# Patient Record
Sex: MALE | Race: WHITE | NOT HISPANIC OR LATINO | Employment: FULL TIME | URBAN - METROPOLITAN AREA
[De-identification: names, ages, dates, MRNs, and addresses within clinical notes are randomized per-mention and may not be internally consistent; named-entity substitution may affect disease eponyms.]

---

## 2017-06-11 ENCOUNTER — HOSPITAL ENCOUNTER (EMERGENCY)
Facility: HOSPITAL | Age: 22
Discharge: HOME | End: 2017-06-11
Attending: EMERGENCY MEDICINE | Admitting: EMERGENCY MEDICINE
Payer: COMMERCIAL

## 2017-06-11 VITALS
BODY MASS INDEX: 33.86 KG/M2 | TEMPERATURE: 99.3 F | SYSTOLIC BLOOD PRESSURE: 133 MMHG | RESPIRATION RATE: 18 BRPM | DIASTOLIC BLOOD PRESSURE: 80 MMHG | HEIGHT: 72 IN | HEART RATE: 100 BPM | OXYGEN SATURATION: 98 % | WEIGHT: 250 LBS

## 2017-06-11 DIAGNOSIS — S01.511A LACERATION OF UPPER LIP, COMPLICATED, INITIAL ENCOUNTER: Primary | ICD-10-CM
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
## 2017-06-11 PROCEDURE — 99282 EMERGENCY DEPT VISIT SF MDM: CPT

## 2017-06-11 RX ORDER — LIDOCAINE HYDROCHLORIDE 10 MG/ML
10 INJECTION, SOLUTION EPIDURAL; INFILTRATION; INTRACAUDAL; PERINEURAL ONCE
Status: COMPLETED | OUTPATIENT
Start: 2017-06-11 | End: 2017-06-11

## 2017-06-11 RX ADMIN — LIDOCAINE HYDROCHLORIDE 10 ML: 10 INJECTION, SOLUTION EPIDURAL; INFILTRATION; INTRACAUDAL; PERINEURAL at 16:52

## 2017-06-16 ENCOUNTER — HOSPITAL ENCOUNTER (EMERGENCY)
Facility: HOSPITAL | Age: 22
Discharge: HOME | End: 2017-06-16
Payer: COMMERCIAL

## 2017-06-16 VITALS
DIASTOLIC BLOOD PRESSURE: 63 MMHG | BODY MASS INDEX: 33.59 KG/M2 | HEART RATE: 83 BPM | WEIGHT: 248 LBS | TEMPERATURE: 98.2 F | SYSTOLIC BLOOD PRESSURE: 140 MMHG | HEIGHT: 72 IN | OXYGEN SATURATION: 97 % | RESPIRATION RATE: 18 BRPM

## 2017-06-16 DIAGNOSIS — Z48.02 VISIT FOR SUTURE REMOVAL: Primary | ICD-10-CM

## 2017-06-16 PROCEDURE — 99281 EMR DPT VST MAYX REQ PHY/QHP: CPT

## 2017-10-27 ENCOUNTER — APPOINTMENT (EMERGENCY)
Dept: RADIOLOGY | Facility: HOSPITAL | Age: 22
End: 2017-10-27
Payer: COMMERCIAL

## 2017-10-27 ENCOUNTER — HOSPITAL ENCOUNTER (EMERGENCY)
Facility: HOSPITAL | Age: 22
Discharge: HOME | End: 2017-10-27
Admitting: EMERGENCY MEDICINE
Payer: COMMERCIAL

## 2017-10-27 VITALS
RESPIRATION RATE: 20 BRPM | BODY MASS INDEX: 32.55 KG/M2 | TEMPERATURE: 101.1 F | DIASTOLIC BLOOD PRESSURE: 80 MMHG | WEIGHT: 240 LBS | HEART RATE: 103 BPM | OXYGEN SATURATION: 95 % | SYSTOLIC BLOOD PRESSURE: 145 MMHG

## 2017-10-27 DIAGNOSIS — R05.9 COUGH: Primary | ICD-10-CM

## 2017-10-27 DIAGNOSIS — R50.9 FEVER: ICD-10-CM

## 2017-10-27 PROCEDURE — 71020 HB CHEST X-RAY 2VW FRONTAL&LATL: CPT

## 2017-10-27 PROCEDURE — 99283 EMERGENCY DEPT VISIT LOW MDM: CPT

## 2017-10-27 RX ORDER — ACETAMINOPHEN 325 MG/1
650 TABLET ORAL ONCE
Status: COMPLETED | OUTPATIENT
Start: 2017-10-27 | End: 2017-10-27

## 2017-10-27 RX ORDER — AZITHROMYCIN 250 MG/1
TABLET, FILM COATED ORAL
Qty: 6 TABLET | Refills: 0 | Status: SHIPPED | OUTPATIENT
Start: 2017-10-27

## 2017-10-27 RX ORDER — PREDNISONE 20 MG/1
50 TABLET ORAL DAILY
Qty: 15 TABLET | Refills: 0 | Status: SHIPPED | OUTPATIENT
Start: 2017-10-27 | End: 2017-11-01

## 2017-10-27 RX ORDER — ALBUTEROL SULFATE 90 UG/1
2 AEROSOL, METERED RESPIRATORY (INHALATION) EVERY 4 HOURS PRN
Qty: 1 INHALER | Refills: 0 | Status: SHIPPED | OUTPATIENT
Start: 2017-10-27

## 2017-10-27 RX ADMIN — ACETAMINOPHEN 650 MG: 325 TABLET, FILM COATED ORAL at 11:04

## 2017-10-27 NOTE — DISCHARGE INSTRUCTIONS
Cold Symptoms, Ambulatory Care   GENERAL INFORMATION:   Cold symptoms  include sneezing, dry throat, a stuffy nose, headache, watery eyes, and a cough  Your cough may be dry, or you may cough up mucus  You may also have muscle aches, joint pain, and tiredness  Rarely, you may have a fever  Cold symptoms occur from inflammation in your upper respiratory system caused by a virus  Most colds go away without treatment  Seek immediate care for the following symptoms:   · A heartbeat that is much faster than usual for you     · A swollen neck that is sore to the touch     · Increased tiredness and weakness    · Pinpoint or larger reddish-purple dots on your skin     · Poor or no appetite  Treatment for cold symptoms  may include NSAIDS to decrease muscle aches and fever  Do not give NSAID medicines to children under 10months of age without direction from your child's doctor  Cold medicines may also be given to decrease coughing, nasal stuffiness, sneezing, and a runny nose  Do not give cold medicines to children under 11years of age without direction from your child's doctor  Manage your cold symptoms with the following:   · Drink liquids  to help thin and loosen thick mucus so you can cough it up  Liquids will also keep you hydrated  Ask your healthcare provider which liquids are best for you and how much to drink each day  · Do not smoke  because it may worsen your symptoms and increase the length of time you feel sick  Talk with your healthcare provider if you need help to stop smoking  Prevent the spread of germs  by washing your hands often  You can spread your cold germs to others for at least 3 days after your symptoms start  Do not share items, such as eating utensils  Cover your nose and mouth when you cough or sneeze using the crook of your elbow instead of your hands  Throw used tissues in the garbage    Follow up with your healthcare provider as directed:  Write down your questions so you remember to ask them during your visits  CARE AGREEMENT:   You have the right to help plan your care  Learn about your health condition and how it may be treated  Discuss treatment options with your caregivers to decide what care you want to receive  You always have the right to refuse treatment  The above information is an  only  It is not intended as medical advice for individual conditions or treatments  Talk to your doctor, nurse or pharmacist before following any medical regimen to see if it is safe and effective for you  © 2014 [address] is for End User's use only and may not be sold, redistributed or otherwise used for commercial purposes  All illustrations and images included in CareNotes® are the copyrighted property of A D A M , Inc  or Bill Rucker

## 2017-10-27 NOTE — ED PROVIDER NOTES
History  Chief Complaint   Patient presents with    Fever - 9 weeks to 74 years     Pt c/o fever for 3 days with a high of 103 0, reports URI symptoms     69-year-old male presents with fever for 3 days cough and congestion  Denies being exposed to strep or mono  Denies sore throat, chest pain, shortness of breath,  denies urinary symptoms  None       History reviewed  No pertinent past medical history  History reviewed  No pertinent surgical history  History reviewed  No pertinent family history  I have reviewed and agree with the history as documented  Social History   Substance Use Topics    Smoking status: Former Smoker     Packs/day: 1 00     Types: Cigarettes     Quit date: 9/27/2017    Smokeless tobacco: Never Used    Alcohol use Yes      Comment: social        Review of Systems   Constitutional: Positive for chills and fever  Negative for appetite change and fatigue  HENT: Positive for congestion  Negative for ear pain, nosebleeds, sinus pain, sore throat and voice change  Eyes: Negative for pain, redness, itching and visual disturbance  Respiratory: Positive for cough  Negative for apnea, chest tightness, shortness of breath and wheezing  Cardiovascular: Negative for chest pain, palpitations and leg swelling  Gastrointestinal: Negative for abdominal distention, abdominal pain, constipation, diarrhea, nausea and vomiting  Genitourinary: Negative for dysuria and testicular pain  Musculoskeletal: Negative for arthralgias, back pain, neck pain and neck stiffness  Skin: Negative for color change and wound  Neurological: Negative for tremors, seizures and speech difficulty  Hematological: Does not bruise/bleed easily  Psychiatric/Behavioral: Negative for behavioral problems         Physical Exam  ED Triage Vitals [10/27/17 1038]   Temperature Pulse Respirations Blood Pressure SpO2   (!) 101 1 °F (38 4 °C) 103 20 145/80 95 %      Temp Source Heart Rate Source Patient Position - Orthostatic VS BP Location FiO2 (%)   Oral Monitor Sitting Right arm --      Pain Score       4           Orthostatic Vital Signs  Vitals:    10/27/17 1038   BP: 145/80   Pulse: 103   Patient Position - Orthostatic VS: Sitting       Physical Exam   Constitutional: He is oriented to person, place, and time  He appears well-developed and well-nourished  HENT:   Head: Atraumatic  Right Ear: Hearing, tympanic membrane and ear canal normal    Left Ear: Hearing, tympanic membrane and ear canal normal    Mouth/Throat: Oropharynx is clear and moist  No oropharyngeal exudate  Eyes: EOM are normal  Pupils are equal, round, and reactive to light  Neck: Normal range of motion and full passive range of motion without pain  Neck supple  Cardiovascular: Normal rate and regular rhythm  Pulmonary/Chest: Effort normal    Musculoskeletal: Normal range of motion  Neurological: He is alert and oriented to person, place, and time  Skin: Skin is warm and dry  Psychiatric: He has a normal mood and affect  Nursing note and vitals reviewed  ED Medications  Medications   acetaminophen (TYLENOL) tablet 650 mg (650 mg Oral Given 10/27/17 1104)       Diagnostic Studies  Results Reviewed     None                 XR chest 2 views   Final Result by Michoacano Louis MD (10/27 1158)      No acute consolidation or congestion      There is focal convex bulge noted in the inferior aspect of the right hilum  This may be projectional or due to focal lesion  Would suggest CT chest to exclude hilar abnormality           ##sigslh##sigslh      ##[id number]##[id number]         Workstation performed: [id number]                    Procedures  Procedures       Phone Contacts  ED Phone Contact    ED Course  ED Course                                MDM  CritCare Time    Disposition  Final diagnoses:   Cough   Fever     Time reflects when diagnosis was documented in both MDM as applicable and the Disposition within this note Time User Action Codes Description Comment    10/27/2017 12:21 PM Burton Acevedo Add [R05] Cough     10/27/2017 12:21 PM Burton Acevedo Add [R50 9] Fever       ED Disposition     ED Disposition Condition Comment    Discharge  Della Ac discharge to home/self care  Condition at discharge: Stable        Follow-up Information     Follow up With Specialties Details Why [facility], [facility] Medicine Schedule an appointment as soon as possible for a visit follow up for cough, fever, and Chest CT  [address]  [phone]          Discharge Medication List as of 10/27/2017 12:23 PM      START taking these medications    Details   albuterol (PROVENTIL HFA,VENTOLIN HFA) 90 mcg/act inhaler Inhale 2 puffs every 4 (four) hours as needed for wheezing, Starting Fri 10/27/2017, Print      azithromycin (ZITHROMAX) 250 mg tablet Take 2 tablets today then 1 tablet daily x 4 days, Print      predniSONE 20 mg tablet Take 2 5 tablets by mouth daily for 5 days, Starting Fri 10/27/2017, Until Wed 11/1/2017, Print           No discharge procedures on file      ED Provider  Electronically Signed by           TOMA Conte  10/27/17 9703

## 2017-10-30 ENCOUNTER — TRANSCRIBE ORDERS (OUTPATIENT)
Dept: ADMINISTRATIVE | Facility: HOSPITAL | Age: 22
End: 2017-10-30

## 2017-10-30 ENCOUNTER — GENERIC CONVERSION - ENCOUNTER (OUTPATIENT)
Dept: OTHER | Facility: OTHER | Age: 22
End: 2017-10-30

## 2017-10-30 DIAGNOSIS — R93.89 ABNORMAL RADIOLOGICAL FINDINGS IN SKIN AND SUBCUTANEOUS TISSUE: Primary | ICD-10-CM

## 2017-11-20 ENCOUNTER — HOSPITAL ENCOUNTER (OUTPATIENT)
Dept: RADIOLOGY | Facility: HOSPITAL | Age: 22
Discharge: HOME | End: 2017-11-20
Attending: INTERNAL MEDICINE
Payer: COMMERCIAL

## 2017-11-20 DIAGNOSIS — R93.89 ABNORMAL FINDINGS ON DIAGNOSTIC IMAGING OF OTHER SPECIFIED BODY STRUCTURES: ICD-10-CM

## 2017-11-20 PROCEDURE — 71250 CT THORAX DX C-: CPT

## 2017-12-06 ENCOUNTER — ALLSCRIPTS OFFICE VISIT (OUTPATIENT)
Dept: OTHER | Facility: OTHER | Age: 22
End: 2017-12-06

## 2017-12-06 ENCOUNTER — TRANSCRIBE ORDERS (OUTPATIENT)
Dept: ADMINISTRATIVE | Facility: HOSPITAL | Age: 22
End: 2017-12-06

## 2017-12-06 DIAGNOSIS — R93.89 ABNORMAL RADIOLOGICAL FINDINGS IN SKIN AND SUBCUTANEOUS TISSUE: Primary | ICD-10-CM

## 2017-12-07 NOTE — CONSULTS
Assessment  1  Abnormal chest CT (793 2) (R93 8)   2  Current every day smoker (305 1) (F17 200)    Plan  Abnormal chest CT    · (1) BASIC METABOLIC PROFILE; Status:Active; Requested for:[id number];    Perform:[facility]; Order Comments:few days prior to CT-chest; Due:56Iot5560; Ordered;chest CT; Ordered By:Nathaly Flores;   · CT CHEST W CONTRAST; Status:Need Information - Financial Authorization; Requestedfor:[id number];    Perform:[facility]; Order Comments:3 months; YGS:[id number]; Ordered;chest CT; Ordered By:Angelika Flores;   · Follow-up visit in 4 Months Evaluation and Treatment  Follow-up  Status: Hold For -Scheduling  Requested for: [id number]   Ordered; Abnormal chest CT; Ordered By: Hafsa Marvin Performed:  Due: 47QJH8477    Results/Data  Results Free Text Form [address] [place] [address] 14:   Results  Chest X-ray chest CT scan reviewed on PACs: Right hilar lymphadenopathy and possible left as well otherwise clear lungs  CT Scan chest CT scan reviewed on PACs:1  Prominence of the right pulmonary hilum, to a lesser extent the left  It is favored that this is related to pulmonary arterial enlargement, right greater than left  However, contrast enhanced CT is recommended to assure this is not related to adenopathy  No acute pulmonary disease  Discussion/Summary  Discussion Summary:   - Abnormal CT scan with questionable hilar lymphadenopathy, this CT scan and chest x-ray with none during his mononucleosis infection and so it could be part of the lymphadenopathy related to that, otherwise this could be reactive versus secondary to sarcoidosis given his family history with his brother having sarcoidosis diagnosis but not on any treatment   I will start by ordering CT scan with IV contrast to confirm the lymphadenopathy and after that we will decide workup, I will defer that to all 3 months from now to give time for any reactive lymph nodes to resolve smoking, counseled him to quit smoking, a for him medications but he declined and he will think about quitting - declined influenza vaccination  Goals and Barriers: The patient has the current Goals: To have evaluation of his CT scan abnormality  The patent has the current Barriers: None  Patient's Capacity to Self-Care: Patient is able to Self-Care  Medication SE Review and Pt Understands Tx: The treatment plan was reviewed with the patient/guardian  The patient/guardian understands and agrees with the treatment plan      Active Problems     · Abnormal chest CT (793 2) (R93 8)    Chief Complaint  Chief Complaint Free Text Note Form: Abnormal chest CT scan      History of Present Illness  HPI: this is a 79-year-old male with no significant past medical history who had recent febrile illness few weeks ago associated with some cough and sore throat and wheezing and initially was treated with 1 course of azithromycin improved then had another fever and his workup revealed mononucleosis  patient improved since then and currently denies any complaints except for mild intermittent cough he attributes to his smoking  He denies any chest pain or fever or chills, denies any night sweats, denies any weight loss, denies any hemoptysis, denies any shortness of breath  No skin lesions or rashes, no arthritis or arthralgia, no other complaints  Patient smokes cigarettes currently 1 pack per day for the past 2 years and before that less than half pack per day for few years  no occupational exposure  Review of Systems  Complete-Male Pulm:  Constitutional: No fever or chills, feels well, no tiredness, no recent weight gain or weight loss  Eyes: no complaints of vision problems  ENT: no rhinitis, no PND, no epistaxis  Cardiovascular: no palpitations, no chest pain  Respiratory: as noted in HPI  Gastrointestinal: no complaints of esophageal reflux, no abdominal pain  Genitourinary: no urinary retention  Musculoskeletal: no arthralgias, no joint swelling, no myalgias  Integumentary: no rash, no lesions  Neurological: no headache, no fainting, no weakness  Psychiatric: no anxiety, no depression  Hematologic/Lymphatic: no complaints of swollen glands  ROS Reviewed:   ROS reviewed  Past Medical History  1  History of allergic rhinitis (V12 69) (Z87 09)   2  History of Mononucleosis (075) (B27 90)  Active Problems And Past Medical History Reviewed: The active problems and past medical history were reviewed and updated today  Surgical History  1  History of Hand Surgery  Surgical History Reviewed: The surgical history was reviewed and updated today  Family History  Brother    1  Family history of sarcoidosis (V19 8) (Z83 2)  Family History    2  Family history of colon cancer (V16 0) (Z80 0)   3  Family history of skin cancer (V16 8) (Z80 8)   4  FHx: brain aneurysm (V17 1) (Z82 49)  Family History Reviewed: The family history was reviewed and updated today  Social History     · Current every day smoker (305 1) (F17 200)   · Has been smoking for about 6 years, a pack of cigarettes a day  · Daily caffeine consumption   · One cup of coffee daily   · No illicit drug use   · Social alcohol use (Z78 9)  Social History Reviewed: The social history was reviewed and updated today  The social history was reviewed and is unchanged  Current Meds   1  Claritin 10 MG Oral Tablet; One tablet daily; Therapy: 49UIX2530 to Recorded   2  Flonase Allergy Relief 50 MCG/ACT Nasal Suspension; One spray in each nostril daily; Therapy: 89NDR4067 to Recorded    Allergies  1  No Known Drug Allergies    Vitals  Vital Signs    Recorded: 15ECI5462 02:29PM   Temperature 97 8 F   Heart Rate 82   Respiration 18   Systolic 884   Diastolic 60   Height 6 ft 1 in   Weight 229 lb 2 oz   BMI Calculated 30 23   BSA Calculated 2 28   O2 Saturation 96, RA       Physical Exam   Constitutional  General appearance: No acute distress, well appearing and well nourished     Ears, Nose, Mouth, and Throat  Nasal mucosa, septum, and turbinates: Normal without edema or erythema  Lips, teeth, and gums: Normal, good dentition  Oropharynx: Normal with no erythema, edema, exudate or lesions  Neck  Neck: Supple, symmetric, trachea midline, no masses  Jugular veins: Normal    Pulmonary  Auscultation of lungs: Clear to auscultation, no rales, no crackles, no wheezing  Cardiovascular  Auscultation of heart: Normal rate and rhythm, normal S1 and S2, no murmurs  Examination of extremities for edema and/or varicosities: Normal    Abdomen  Abdomen: Soft, non-tender  Lymphatic  Palpation of lymph nodes in neck: No lymphadenopathy  Musculoskeletal  Gait and station: Normal    Digits and nails: Normal without clubbing or cyanosis  -- 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

## 2018-01-17 NOTE — MISCELLANEOUS
Message  Patient called stating that he was in the ER for a high fever and they did a chest xray and found a spot on his lung  The ER suggested he be evaluated by pulmonary  The first available appointment is 12/06/2017 with Dr Savannah Reynolds  Patient wasn't sure if he could wait that long to be seen  I told him that I would have one of the doctors look at the report to see if he could wait until December  Per Dr Savannah Reynolds patient is fine to be seen on December 6, 2017  He would like him to have a CT of the chest before that appointment  The appointment was made and the patient was contacted with this information  Active Problems    1  Abnormal chest CT (793 2) (R93 8)    Plan  Abnormal chest CT    · * CT CHEST WO CONTRAST; Status:Need Information - Financial  Authorization,Retrospective Authorization;  Requested [facility]:[id number] 09:30AM;     Signatures   Electronically signed by : Tamica Shah, ; Oct 30 2017 11:53AM EST                       (Author)

## 2018-01-22 VITALS
TEMPERATURE: 97.8 F | SYSTOLIC BLOOD PRESSURE: 110 MMHG | HEIGHT: 73 IN | WEIGHT: 229.13 LBS | OXYGEN SATURATION: 96 % | BODY MASS INDEX: 30.37 KG/M2 | DIASTOLIC BLOOD PRESSURE: 60 MMHG | RESPIRATION RATE: 18 BRPM | HEART RATE: 82 BPM

## 2020-10-30 ENCOUNTER — TRANSCRIBE ORDERS (OUTPATIENT)
Dept: ADMINISTRATIVE | Facility: HOSPITAL | Age: 25
End: 2020-10-30

## 2020-10-30 DIAGNOSIS — R59.1 GENERALIZED ENLARGED LYMPH NODES: Primary | ICD-10-CM

## 2020-10-30 DIAGNOSIS — R93.89 ABNORMAL FINDINGS ON DIAGNOSTIC IMAGING OF OTHER SPECIFIED BODY STRUCTURES: ICD-10-CM

## 2020-10-30 DIAGNOSIS — Z86.19 HISTORY OF MONONUCLEOSIS: ICD-10-CM

## 2020-10-30 DIAGNOSIS — Z84.89 FAMILY HISTORY OF OTHER SPECIFIED CONDITIONS: ICD-10-CM

## 2020-11-17 ENCOUNTER — HOSPITAL ENCOUNTER (OUTPATIENT)
Dept: CT IMAGING | Facility: HOSPITAL | Age: 25
Discharge: HOME | End: 2020-11-17
Payer: COMMERCIAL

## 2020-11-17 ENCOUNTER — APPOINTMENT (OUTPATIENT)
Dept: LAB | Facility: HOSPITAL | Age: 25
End: 2020-11-17
Payer: COMMERCIAL

## 2020-11-17 ENCOUNTER — TRANSCRIBE ORDERS (OUTPATIENT)
Dept: ADMINISTRATIVE | Facility: HOSPITAL | Age: 25
End: 2020-11-17

## 2020-11-17 DIAGNOSIS — R94.5 NONSPECIFIC ABNORMAL RESULTS OF LIVER FUNCTION STUDY: ICD-10-CM

## 2020-11-17 DIAGNOSIS — R93.89 ABNORMAL FINDINGS ON DIAGNOSTIC IMAGING OF OTHER SPECIFIED BODY STRUCTURES: ICD-10-CM

## 2020-11-17 DIAGNOSIS — B27.80 INFECTIOUS MONONUCLEOSIS-LIKE SYNDROME: ICD-10-CM

## 2020-11-17 DIAGNOSIS — Z72.0 TOBACCO ABUSE: ICD-10-CM

## 2020-11-17 DIAGNOSIS — J30.9 ALLERGIC RHINITIS, UNSPECIFIED SEASONALITY, UNSPECIFIED TRIGGER: ICD-10-CM

## 2020-11-17 DIAGNOSIS — R93.89 ABNORMAL RADIOLOGICAL FINDINGS IN SKIN AND SUBCUTANEOUS TISSUE: ICD-10-CM

## 2020-11-17 DIAGNOSIS — Z86.19 HISTORY OF MONONUCLEOSIS: ICD-10-CM

## 2020-11-17 DIAGNOSIS — R94.5 NONSPECIFIC ABNORMAL RESULTS OF LIVER FUNCTION STUDY: Primary | ICD-10-CM

## 2020-11-17 DIAGNOSIS — R42 DIZZINESS AND GIDDINESS: ICD-10-CM

## 2020-11-17 DIAGNOSIS — Z84.89 FAMILY HISTORY OF OTHER SPECIFIED CONDITIONS: ICD-10-CM

## 2020-11-17 DIAGNOSIS — R59.1 GENERALIZED ENLARGED LYMPH NODES: ICD-10-CM

## 2020-11-17 LAB
ALBUMIN SERPL BCP-MCNC: 4.3 G/DL (ref 3.5–5)
ALP SERPL-CCNC: 73 U/L (ref 46–116)
ALT SERPL W P-5'-P-CCNC: 69 U/L (ref 12–78)
ANION GAP SERPL CALCULATED.3IONS-SCNC: 7 MMOL/L (ref 4–13)
AST SERPL W P-5'-P-CCNC: 60 U/L (ref 5–45)
BACTERIA UR QL AUTO: ABNORMAL /HPF
BASOPHILS # BLD AUTO: 0.04 THOUSANDS/ΜL (ref 0–0.1)
BASOPHILS NFR BLD AUTO: 1 % (ref 0–1)
BILIRUB SERPL-MCNC: 1.07 MG/DL (ref 0.2–1)
BILIRUB UR QL STRIP: NEGATIVE
BUN SERPL-MCNC: 14 MG/DL (ref 5–25)
CALCIUM SERPL-MCNC: 9.7 MG/DL (ref 8.3–10.1)
CHLORIDE SERPL-SCNC: 105 MMOL/L (ref 100–108)
CHOLEST SERPL-MCNC: 208 MG/DL (ref 50–200)
CLARITY UR: CLEAR
CO2 SERPL-SCNC: 29 MMOL/L (ref 21–32)
COLOR UR: YELLOW
CREAT SERPL-MCNC: 0.81 MG/DL (ref 0.6–1.3)
EOSINOPHIL # BLD AUTO: 0.2 THOUSAND/ΜL (ref 0–0.61)
EOSINOPHIL NFR BLD AUTO: 3 % (ref 0–6)
ERYTHROCYTE [DISTWIDTH] IN BLOOD BY AUTOMATED COUNT: 12 % (ref 11.6–15.1)
GFR SERPL CREATININE-BSD FRML MDRD: 123 ML/MIN/1.73SQ M
GLUCOSE P FAST SERPL-MCNC: 83 MG/DL (ref 65–99)
GLUCOSE UR STRIP-MCNC: NEGATIVE MG/DL
HCT VFR BLD AUTO: 47.2 % (ref 36.5–49.3)
HDLC SERPL-MCNC: 36 MG/DL
HGB BLD-MCNC: 16.1 G/DL (ref 12–17)
HGB UR QL STRIP.AUTO: NEGATIVE
IMM GRANULOCYTES # BLD AUTO: 0.01 THOUSAND/UL (ref 0–0.2)
IMM GRANULOCYTES NFR BLD AUTO: 0 % (ref 0–2)
KETONES UR STRIP-MCNC: NEGATIVE MG/DL
LDLC SERPL CALC-MCNC: 150 MG/DL (ref 0–100)
LEUKOCYTE ESTERASE UR QL STRIP: NEGATIVE
LYMPHOCYTES # BLD AUTO: 2.03 THOUSANDS/ΜL (ref 0.6–4.47)
LYMPHOCYTES NFR BLD AUTO: 29 % (ref 14–44)
MCH RBC QN AUTO: 28.8 PG (ref 26.8–34.3)
MCHC RBC AUTO-ENTMCNC: 34.1 G/DL (ref 31.4–37.4)
MCV RBC AUTO: 84 FL (ref 82–98)
MONOCYTES # BLD AUTO: 0.56 THOUSAND/ΜL (ref 0.17–1.22)
MONOCYTES NFR BLD AUTO: 8 % (ref 4–12)
NEUTROPHILS # BLD AUTO: 4.09 THOUSANDS/ΜL (ref 1.85–7.62)
NEUTS SEG NFR BLD AUTO: 59 % (ref 43–75)
NITRITE UR QL STRIP: NEGATIVE
NON-SQ EPI CELLS URNS QL MICRO: ABNORMAL /HPF
NONHDLC SERPL-MCNC: 172 MG/DL
NRBC BLD AUTO-RTO: 0 /100 WBCS
PH UR STRIP.AUTO: 7 [PH]
PLATELET # BLD AUTO: 265 THOUSANDS/UL (ref 149–390)
PMV BLD AUTO: 9.3 FL (ref 8.9–12.7)
POTASSIUM SERPL-SCNC: 4 MMOL/L (ref 3.5–5.3)
PROT SERPL-MCNC: 7.8 G/DL (ref 6.4–8.2)
PROT UR STRIP-MCNC: NEGATIVE MG/DL
RBC # BLD AUTO: 5.6 MILLION/UL (ref 3.88–5.62)
RBC #/AREA URNS AUTO: ABNORMAL /HPF
SODIUM SERPL-SCNC: 141 MMOL/L (ref 136–145)
SP GR UR STRIP.AUTO: 1.02 (ref 1–1.03)
TRIGL SERPL-MCNC: 108 MG/DL
TSH SERPL DL<=0.05 MIU/L-ACNC: 1.22 UIU/ML (ref 0.36–3.74)
UROBILINOGEN UR QL STRIP.AUTO: 0.2 E.U./DL
WBC # BLD AUTO: 6.93 THOUSAND/UL (ref 4.31–10.16)
WBC #/AREA URNS AUTO: ABNORMAL /HPF

## 2020-11-17 PROCEDURE — 36415 COLL VENOUS BLD VENIPUNCTURE: CPT

## 2020-11-17 PROCEDURE — G1004 CDSM NDSC: HCPCS

## 2020-11-17 PROCEDURE — 81001 URINALYSIS AUTO W/SCOPE: CPT | Performed by: PHYSICIAN ASSISTANT

## 2020-11-17 PROCEDURE — 85025 COMPLETE CBC W/AUTO DIFF WBC: CPT

## 2020-11-17 PROCEDURE — 80061 LIPID PANEL: CPT

## 2020-11-17 PROCEDURE — 80074 ACUTE HEPATITIS PANEL: CPT

## 2020-11-17 PROCEDURE — 71260 CT THORAX DX C+: CPT

## 2020-11-17 PROCEDURE — 84443 ASSAY THYROID STIM HORMONE: CPT

## 2020-11-17 PROCEDURE — 80053 COMPREHEN METABOLIC PANEL: CPT

## 2020-11-17 RX ADMIN — IOHEXOL 85 ML: 350 INJECTION, SOLUTION INTRAVENOUS at 13:03

## 2020-11-18 LAB
HAV IGM SER QL: NORMAL
HBV CORE IGM SER QL: NORMAL
HBV SURFACE AG SER QL: NORMAL
HCV AB SER QL: NORMAL

## 2021-02-10 ENCOUNTER — OFFICE VISIT (OUTPATIENT)
Dept: PULMONOLOGY | Facility: CLINIC | Age: 26
End: 2021-02-10
Payer: COMMERCIAL

## 2021-02-10 VITALS
SYSTOLIC BLOOD PRESSURE: 130 MMHG | DIASTOLIC BLOOD PRESSURE: 70 MMHG | BODY MASS INDEX: 32.51 KG/M2 | WEIGHT: 240 LBS | OXYGEN SATURATION: 97 % | HEART RATE: 76 BPM | TEMPERATURE: 98.6 F | HEIGHT: 72 IN

## 2021-02-10 DIAGNOSIS — F17.210 CIGARETTE NICOTINE DEPENDENCE WITHOUT COMPLICATION: ICD-10-CM

## 2021-02-10 DIAGNOSIS — R59.0 HILAR LYMPHADENOPATHY: Primary | ICD-10-CM

## 2021-02-10 PROCEDURE — 99204 OFFICE O/P NEW MOD 45 MIN: CPT | Performed by: INTERNAL MEDICINE

## 2021-02-10 NOTE — PROGRESS NOTES
Pulmonary Consultation   Marcell King 31 y o  male MRN: [id number]  2/10/2021      Referring provider: Dr Josef Gillette  Reason for consult: Abnormal CT chest    Assessment and Plan:  Cigarette nicotine dependence without complication  -  Interested in quitting  Discussed importance of tobacco cessation at length  Hilar lymphadenopathy  -  Mild hilar lymphadenopathy present on CT scan in 2020 and unchanged from CT scan in 2017  This is very unlikely to be lymphoma or sarcoidosis  Since patient is asymptomatic, no further workup anticipated at this time  Diagnoses and all orders for this visit:    Hilar lymphadenopathy    Cigarette nicotine dependence without complication    Discussed with pt at length  Concerns addressed  Follow up in future as needed  History of Present Illness   HPI:  Marcell King is a 31 y o  male who presents for follow up  States he was told by his PCP to come in for further follow up  Have a history of abnormal CT scan  Was seen here by Dr Yajaira Tolentino  Had a follow up CT ordered but did not have it completed  Denies SOB  Feels like he "focuses on his breathing too much"  Has a "smoker's cough"  Walks 4-5 miles at work and is not winded  Is easily winded when he runs or rides his bike  Not interested in quitting smoking  Was able to quit 2 years ago and then resumed smoking      Brother has pulmonary sarcoidosis    Review of Systems  Positive as mentioned above and negative otherwise    Historical Information   PMH  None    PSH  Right hand surgery      Family History   Problem Relation Age of Onset    Diabetes Mother     Cancer Father     Sarcoidosis Brother        Occupational History: works for [employer]    Social History: Smokes 1 ppd x 2 years  +Alcohol 5-6 beers per week    Meds/Allergies     Current Outpatient Medications:     albuterol (PROVENTIL HFA,VENTOLIN HFA) 90 mcg/act inhaler, Inhale 2 puffs every 4 (four) hours as needed for wheezing (Patient not taking: Reported on 2/10/2021), Disp: 1 Inhaler, Rfl: 0    azithromycin (ZITHROMAX) 250 mg tablet, Take 2 tablets today then 1 tablet daily x 4 days (Patient not taking: Reported on 2/10/2021), Disp: 6 tablet, Rfl: 0  No Known Allergies    Vitals: Blood pressure 130/70, pulse 76, temperature 98 6 °F (37 °C), height 6' (1 829 m), weight 109 kg (240 lb), SpO2 97 %  , Body mass index is 32 55 kg/m²  Oxygen Therapy  SpO2: 97 %  Oxygen Therapy: None (Room air)    Physical Exam   GEN: WDWN, nad, comfortable  HEENT: NCAT, EOMI, anicteric sclera  CVS: Regular, no m/r/g  LUNGS: CTA b/l, no w/r/r  ABD: soft, nd, nt  EXT: No c/c/e  NEURO: No focal deficits  MS: Moving all extremities  SKIN: warm, dry  PSYCH: calm, cooperative      Labs: I have personally reviewed pertinent lab results  Lab Results   Component Value Date    WBC 6 93 11/17/2020    HGB 16 1 11/17/2020    HCT 47 2 11/17/2020    MCV 84 11/17/2020     11/17/2020     Lab Results   Component Value Date    CALCIUM 9 7 11/17/2020    K 4 0 11/17/2020    CO2 29 11/17/2020     11/17/2020    BUN 14 11/17/2020    CREATININE 0 81 11/17/2020     No results found for: IGE  Lab Results   Component Value Date    ALT 69 11/17/2020    AST 60 (H) 11/17/2020    ALKPHOS 73 11/17/2020       Labs per my interpretation show  Normal renal function, normal hemoglobin, negative hepatitis panel  Imaging and other studies: I have personally reviewed pertinent films in PACS    CT chest per my review and reviewed with the patient shows mild hilar adenopathy right side greater than left  Unchanged compared to previous  [facility]

## 2021-02-10 NOTE — ASSESSMENT & PLAN NOTE
-  Mild hilar lymphadenopathy present on CT scan in 2020 and unchanged from CT scan in 2017  This is very unlikely to be lymphoma or sarcoidosis  Since patient is asymptomatic, no further workup anticipated at this time

## 2024-09-20 PROCEDURE — 88304 TISSUE EXAM BY PATHOLOGIST: CPT | Performed by: SPECIALIST

## 2024-09-27 PROCEDURE — 88304 TISSUE EXAM BY PATHOLOGIST: CPT | Performed by: SPECIALIST

## 2024-10-18 ENCOUNTER — APPOINTMENT (OUTPATIENT)
Dept: URGENT CARE | Facility: CLINIC | Age: 29
End: 2024-10-18

## 2025-05-31 ENCOUNTER — HOSPITAL ENCOUNTER (EMERGENCY)
Facility: HOSPITAL | Age: 30
Discharge: HOME | End: 2025-05-31
Attending: EMERGENCY MEDICINE

## 2025-05-31 VITALS
SYSTOLIC BLOOD PRESSURE: 137 MMHG | TEMPERATURE: 98.2 F | DIASTOLIC BLOOD PRESSURE: 88 MMHG | RESPIRATION RATE: 18 BRPM | OXYGEN SATURATION: 98 % | HEART RATE: 78 BPM

## 2025-05-31 DIAGNOSIS — F41.9 ANXIETY: Primary | ICD-10-CM

## 2025-05-31 LAB
ALBUMIN SERPL BCG-MCNC: 4.5 G/DL (ref 3.5–5)
ALP SERPL-CCNC: 75 U/L (ref 34–104)
ALT SERPL W P-5'-P-CCNC: 50 U/L (ref 7–52)
ANION GAP SERPL CALCULATED.3IONS-SCNC: 9 MMOL/L (ref 4–13)
AST SERPL W P-5'-P-CCNC: 51 U/L (ref 13–39)
BASOPHILS # BLD AUTO: 0.07 THOUSANDS/ÂΜL (ref 0–0.1)
BASOPHILS NFR BLD AUTO: 1 % (ref 0–1)
BILIRUB SERPL-MCNC: 0.68 MG/DL (ref 0.2–1)
BUN SERPL-MCNC: 12 MG/DL (ref 5–25)
CALCIUM SERPL-MCNC: 9.5 MG/DL (ref 8.4–10.2)
CHLORIDE SERPL-SCNC: 104 MMOL/L (ref 96–108)
CO2 SERPL-SCNC: 27 MMOL/L (ref 21–32)
CREAT SERPL-MCNC: 0.69 MG/DL (ref 0.6–1.3)
EOSINOPHIL # BLD AUTO: 0.39 THOUSAND/ÂΜL (ref 0–0.61)
EOSINOPHIL NFR BLD AUTO: 4 % (ref 0–6)
ERYTHROCYTE [DISTWIDTH] IN BLOOD BY AUTOMATED COUNT: 12.4 % (ref 11.6–15.1)
GFR SERPL CREATININE-BSD FRML MDRD: 126 ML/MIN/1.73SQ M
GLUCOSE SERPL-MCNC: 83 MG/DL (ref 65–140)
HCT VFR BLD AUTO: 46.5 % (ref 36.5–49.3)
HGB BLD-MCNC: 16.5 G/DL (ref 12–17)
IMM GRANULOCYTES # BLD AUTO: 0.04 THOUSAND/UL (ref 0–0.2)
IMM GRANULOCYTES NFR BLD AUTO: 0 % (ref 0–2)
LYMPHOCYTES # BLD AUTO: 2.04 THOUSANDS/ÂΜL (ref 0.6–4.47)
LYMPHOCYTES NFR BLD AUTO: 22 % (ref 14–44)
MAGNESIUM SERPL-MCNC: 2.1 MG/DL (ref 1.9–2.7)
MCH RBC QN AUTO: 30.6 PG (ref 26.8–34.3)
MCHC RBC AUTO-ENTMCNC: 35.5 G/DL (ref 31.4–37.4)
MCV RBC AUTO: 86 FL (ref 82–98)
MONOCYTES # BLD AUTO: 0.93 THOUSAND/ÂΜL (ref 0.17–1.22)
MONOCYTES NFR BLD AUTO: 10 % (ref 4–12)
NEUTROPHILS # BLD AUTO: 5.84 THOUSANDS/ÂΜL (ref 1.85–7.62)
NEUTS SEG NFR BLD AUTO: 63 % (ref 43–75)
NRBC BLD AUTO-RTO: 0 /100 WBCS
PLATELET # BLD AUTO: 277 THOUSANDS/UL (ref 149–390)
PMV BLD AUTO: 9.5 FL (ref 8.9–12.7)
POTASSIUM SERPL-SCNC: 3.6 MMOL/L (ref 3.5–5.3)
PROT SERPL-MCNC: 7.2 G/DL (ref 6.4–8.4)
RBC # BLD AUTO: 5.4 MILLION/UL (ref 3.88–5.62)
SODIUM SERPL-SCNC: 140 MMOL/L (ref 135–147)
WBC # BLD AUTO: 9.31 THOUSAND/UL (ref 4.31–10.16)

## 2025-05-31 PROCEDURE — 85025 COMPLETE CBC W/AUTO DIFF WBC: CPT

## 2025-05-31 PROCEDURE — 36415 COLL VENOUS BLD VENIPUNCTURE: CPT

## 2025-05-31 PROCEDURE — 93005 ELECTROCARDIOGRAM TRACING: CPT

## 2025-05-31 PROCEDURE — 80053 COMPREHEN METABOLIC PANEL: CPT

## 2025-05-31 PROCEDURE — 99285 EMERGENCY DEPT VISIT HI MDM: CPT

## 2025-05-31 PROCEDURE — 83735 ASSAY OF MAGNESIUM: CPT

## 2025-05-31 NOTE — ED PROVIDER NOTES
ED Disposition       None          Assessment & Plan   {Hyperlinks  Risk Stratification - NIHSS - HEART SCORE - Fill out sepsis note and make sure you call 5555 if severe or septic shock:[id number]}    Medical Decision Making  Amount and/or Complexity of Data Reviewed  Labs: ordered.             Medications - No data to display    ED Risk Strat Scores                    No data recorded                            History of Present Illness   {Hyperlinks  History (Med, Surg, Fam, Social) - Current Medications - Allergies  :[id number]}    Chief Complaint   Patient presents with    Dizziness     Pt reports intermittent dizziness and palpitations x2 weeks        Past Medical History[1]   Past Surgical History[2]   Family History[3]   Social History[4]   E-Cigarette/Vaping    E-Cigarette Use Never User       E-Cigarette/Vaping Substances      I have reviewed and agree with the history as documented.     OSCAR Ashby is a 30-year-old male with a history of allergic rhinitis who presents with dizziness heart palpitations fatigue and changes in vision.  Patient started to notice the symptoms 2 weeks ago and it has been intermittent since.  First noticed when he went to a concert 2 weeks ago in [place] while he was driving.   Patient does not experience worsening symptoms on exertion they are random.  His dizziness does not worsen with changes in body position head movement and  Patient denies any tinnitus. Possibly had an episode of sinusitis 1-2 months ago. Patient reports these symptoms only occur when he is outside or at work not when he is at home. Patient is an alcohol user. He drinks more during the weekend at times a case of beer. He drinks atleast 4 bottles of beer on weekdays as well.     No recent fever, no other infections (negative pneumonia, ear infection). No recent falls. No syncope. No changes in motor strength or sensation noted.  No chest pain or shortness of breath.     Review of  Systems        Objective   {Hyperlinks  Historical Vitals - Historical Labs - Chart Review/Microbiology - Last Echo - Code Status  :[id number]}    ED Triage Vitals [25]   Temperature Pulse Blood Pressure Respirations SpO2 Patient Position - Orthostatic VS   98.2 °F (36.8 °C) 78 167/98 18 99 % --      Temp Source Heart Rate Source BP Location FiO2 (%) Pain Score    Oral Monitor Right arm -- --      Vitals      Date and Time Temp Pulse SpO2 Resp BP Pain Score FACES Pain Rating User   25 98.2 °F (36.8 °C) 78 99 % 18 167/98 -- -- YANDEL            Physical Exam    Results Reviewed       Procedure Component Value Units Date/Time    Magnesium [[id number]]     Lab Status: No result Specimen: Blood     Comprehensive metabolic panel [[id number]]     Lab Status: No result Specimen: Blood     CBC and differential [[id number]]     Lab Status: No result Specimen: Blood             No orders to display       Procedures    ED Medication and Procedure Management   Prior to Admission Medications   Prescriptions Last Dose Informant Patient Reported? Taking?   cetirizine (ZyrTEC) 10 mg tablet   Yes No   Sig: Take 10 mg by mouth daily   fluticasone (FLONASE) 50 mcg/act nasal spray   Yes No   Si spray into each nostril daily      Facility-Administered Medications: None     Patient's Medications   Discharge Prescriptions    No medications on file     No discharge procedures on file.  ED SEPSIS DOCUMENTATION                [1] No past medical history on file.  [2] No past surgical history on file.  [3]   Family History  Problem Relation Name Age of Onset    Diabetes Mother      Cancer Father      Sarcoidosis Brother     [4]   Social History  Tobacco Use    Smoking status: Every Day     Current packs/day: 1.00     Average packs/day: 1 pack/day for 12.0 years (12.0 ttl pk-yrs)     Types: Cigarettes    Smokeless tobacco: Current     Types: Chew   Vaping Use    Vaping status: Never Used   Substance Use Topics    Alcohol  use: Yes     Comment: social    Drug use: No

## 2025-06-01 LAB
ATRIAL RATE: 78 BPM
P AXIS: 39 DEGREES
PR INTERVAL: 164 MS
QRS AXIS: 78 DEGREES
QRSD INTERVAL: 106 MS
QT INTERVAL: 368 MS
QTC INTERVAL: 419 MS
T WAVE AXIS: 38 DEGREES
VENTRICULAR RATE: 78 BPM

## 2025-06-01 PROCEDURE — 93010 ELECTROCARDIOGRAM REPORT: CPT | Performed by: INTERNAL MEDICINE

## 2025-06-12 NOTE — ED ATTENDING ATTESTATION
5/31/2025  I, Nicola Zelaya MD, saw and evaluated the patient. I have discussed the patient with the resident/non-physician practitioner and agree with the resident's/non-physician practitioner's findings, Plan of Care, and MDM as documented in the resident's/non-physician practitioner's note, except where noted. All available labs and Radiology studies were reviewed.  I was present for key portions of any procedure(s) performed by the resident/non-physician practitioner and I was immediately available to provide assistance.       At this point I agree with the current assessment done in the Emergency Department.  I have conducted an independent evaluation of this patient a history and physical is as follows:    30-year-old male presented for evaluation of feeling some intermittent lightheadedness/dizziness/heart pounding over the last 2 weeks.  He reported he first felt the symptoms while he was tailgating football game drinking alcohol and then the symptoms have come back intermittently since then.  When he does have dizziness it does not seem to change with changes of position.  He has not had any falls or has not had any limitations of his activities as a result.    He is asymptomatic at the moment.  Normal vitals.  TMs are normal.  No nystagmus.  Normal head impulse.  Normal test of skew.  Negative Nichols-Hallpike.    ED Course  ED Course as of 06/12/25 1834   Sat May 31, 2025   2026 AST(!): 51  Mild chronic elevation, may be related to alcohol use.   2027 Otherwise lab work unremarkable.     Plan discharge home.  Recommend cutting back on alcohol.    Critical Care Time  Procedures